# Patient Record
Sex: FEMALE | Race: WHITE | NOT HISPANIC OR LATINO | URBAN - NONMETROPOLITAN AREA
[De-identification: names, ages, dates, MRNs, and addresses within clinical notes are randomized per-mention and may not be internally consistent; named-entity substitution may affect disease eponyms.]

---

## 2023-09-27 NOTE — PROGRESS NOTES
Occupational Therapy    POC Trancription from [facility]    Name: Gina Cronin  MRN: [id number]  : 1970  Date: 23     Plan:  Treatment Interventions:  (vasopneumatic device , compression bandaging , edema control , education/instruction , garment measuring/fitting , home program , manual lymphatic drainage , manual therapy , therapeutic activities and therapeutic exercises.)  Frequency: on hold until after she receives custom garments      Goals:  Encounter Problems       Encounter Problems (Active)       OT Goals       Garments       Start:  23    Expected End:  23       Pt will don and doff compression garments mod I per recommended wear schedule 100% of the time         HEP       Start:  23    Expected End:  23        I with HEP         Lymphedema       Start:  23    Expected End:  23       Decrease volume in BLE by 10% to improve tissue health and reduce risk of infection,         LLIS       Start:  23    Expected End:  23       Pt will demonstrate improvement in QOL as evidenced by 20 point loss of LLIS by discharge

## 2023-10-10 ENCOUNTER — TELEPHONE (OUTPATIENT)
Dept: OCCUPATIONAL THERAPY | Facility: HOSPITAL | Age: 53
End: 2023-10-10

## 2023-10-10 NOTE — PROGRESS NOTES
Occupational Therapy    Discharge Summary    Name: Gina Cronin  MRN: [id number]  : 1970  Date: 10/10/23    Discharge Summary: OT    Discharge Information: Date of discharge 10/10/23, Date of last visit 23, Date of evaluation 23, Number of attended visits 12, Referred by [facility], and Referred for Lymphedema    Therapy Summary: Pt had significant reduction in LLE volume with inelastic wraps, MLD, pumping, skin care. Pt was able to get qualified for pneumatic pump for home and is consistent with performing 5-6x a week. Pt also fitted with custom flat knit garments and has been wearing for a few weeks.    Discharge Status: Pt does not need any modifications to custom garments, states the garments fit well and extremely comfortable. At this time, no further need for OT. Recommend discharge. Discussed on phone reasons to return to OT if having increased exacerbation. If she needs new garments, she is able to contact the company and order directly from them without OT intervention unless a new size is required.     Rehab Discharge Reason: Achieved all and/or the most significant goals(s)

## 2023-11-14 PROBLEM — L73.2 HYDRADENITIS: Status: ACTIVE | Noted: 2018-09-13

## 2023-11-14 PROBLEM — E55.9 VITAMIN D DEFICIENCY: Status: ACTIVE | Noted: 2023-11-14

## 2023-11-14 PROBLEM — K43.2 INCISIONAL HERNIA, WITHOUT OBSTRUCTION OR GANGRENE: Status: ACTIVE | Noted: 2023-11-14

## 2023-11-14 PROBLEM — R73.03 PREDIABETES: Status: RESOLVED | Noted: 2018-09-13 | Resolved: 2023-11-14

## 2023-11-14 PROBLEM — M17.12 PRIMARY OSTEOARTHRITIS OF LEFT KNEE: Status: ACTIVE | Noted: 2018-09-18

## 2023-11-14 PROBLEM — E11.9 TYPE 2 DIABETES MELLITUS (MULTI): Status: ACTIVE | Noted: 2020-08-05

## 2023-11-14 PROBLEM — G89.29 CHRONIC LOW BACK PAIN: Status: ACTIVE | Noted: 2023-11-14

## 2023-11-14 PROBLEM — M70.62 GREATER TROCHANTERIC BURSITIS OF LEFT HIP: Status: ACTIVE | Noted: 2021-09-13

## 2023-11-14 PROBLEM — E66.01 OBESITY, CLASS III, BMI 40-49.9 (MORBID OBESITY) (MULTI): Status: ACTIVE | Noted: 2018-09-13

## 2023-11-14 PROBLEM — F32.0 DEPRESSION, MAJOR, SINGLE EPISODE, MILD (CMS-HCC): Status: ACTIVE | Noted: 2018-09-13

## 2023-11-14 PROBLEM — N92.6 IRREGULAR MENSTRUATION: Status: ACTIVE | Noted: 2023-11-14

## 2023-11-14 PROBLEM — D69.3 CHRONIC ITP (IDIOPATHIC THROMBOCYTOPENIC PURPURA) (MULTI): Status: ACTIVE | Noted: 2018-09-13

## 2023-11-14 PROBLEM — I26.99 BILATERAL PULMONARY EMBOLISM (MULTI): Status: ACTIVE | Noted: 2021-10-18

## 2023-11-14 PROBLEM — R76.0 ANTICARDIOLIPIN ANTIBODY POSITIVE: Status: ACTIVE | Noted: 2023-11-14

## 2023-11-14 PROBLEM — R73.01 ELEVATED FASTING GLUCOSE: Status: ACTIVE | Noted: 2023-11-14

## 2023-11-14 PROBLEM — M76.02 GLUTEAL TENDONITIS OF LEFT BUTTOCK: Status: ACTIVE | Noted: 2021-09-13

## 2023-11-14 PROBLEM — I10 HYPERTENSION, ESSENTIAL: Status: ACTIVE | Noted: 2018-09-13

## 2023-11-14 PROBLEM — M17.0 BILATERAL PRIMARY OSTEOARTHRITIS OF KNEE: Status: ACTIVE | Noted: 2019-03-27

## 2023-11-14 PROBLEM — E66.813 OBESITY, CLASS III, BMI 40-49.9 (MORBID OBESITY): Status: ACTIVE | Noted: 2018-09-13

## 2023-11-14 PROBLEM — M54.50 CHRONIC LOW BACK PAIN: Status: ACTIVE | Noted: 2023-11-14

## 2023-11-14 PROBLEM — M75.101 ROTATOR CUFF SYNDROME OF RIGHT SHOULDER: Status: ACTIVE | Noted: 2022-11-03

## 2023-11-14 PROBLEM — M79.7 FIBROMYALGIA: Status: ACTIVE | Noted: 2018-09-13

## 2023-11-14 PROBLEM — F41.8 DEPRESSION WITH ANXIETY: Status: ACTIVE | Noted: 2023-11-14

## 2023-11-14 PROBLEM — M54.50 ACUTE MIDLINE LOW BACK PAIN WITHOUT SCIATICA: Status: RESOLVED | Noted: 2021-08-03 | Resolved: 2023-11-14

## 2023-11-14 NOTE — PROGRESS NOTES
Gina Cronin is a 53 y.o. female who presents for Establish Care (Would like to discuss weight issues, she does have HBP which seem to be under control for now, also borderline diabetic. Lots of anxiety issues. )    Xfer care from Dr. Mckenzie.    Prediabetes: Never been on meds. Never been on weight loss meds. Hasn't had a period for 6 or 7 years. Overdue for pap smear.     H/o blood clots: 2021, several in the legs and lung. She has had swelling since then. She is on warfarin lifelong. Getting INR monitored through [facility], following with Dr. Llanes.     ATP: Following with Dr. Llanes. Under control, not actively getting treatment.     HTN: On lisinopril, metoprolol, and lasix. No SE's.     Lymphedema: Going to [facility] who recommended her. She ended up getting massage, she has a pump that she is using daily. She is using custom stockings. She is wrapping them at night. She is taking lasix daily. She has been relatively stable this last 6mo with her regimen. Left leg worse than right.    Hidradenitis Supuritiva: On doxycycline. She has had surgery in the left axilla. Still occasionally getting flares/outbreaks. She has tried multiple different topical meds in the past.     GERD: Controlled on omeprazole.     Anxiety: On cymbalta, feels it is helping a bit but not happy with her symptoms. More anxious since menopause. Previously took 2 meds, the other was celexa. She has tried off both and that did not work. She does get hot flashes and mood swings. Has not tried effexor or paxil.     Review of systems completed and unremarkable other than what is documented in HPI.     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  Medical history: anxiety  Medications: calcium, vitamin b12, cymbalta, furosemide, lisinopril, metoprolol, MVI, omeprazole, warfarin  SurgHx: tonsillectomy, adenoidectomy, cholecystectomy, umbilical hernia  "repair, hidradenitis suppurativa surgery on left axilla  Fhx: DM and HTN runs in the family, no cancer, dad had angina and afib, mom had CAD, both her parents have passed,  Allergies: NKDA    Objective   BP (!) 142/104 (BP Location: Left arm)   Pulse 79   Ht 1.676 m (5' 6\")   Wt (!) 181 kg (399 lb 9.6 oz)   BMI 64.50 kg/m²     Gen: No acute distress, alert and oriented x3, pleasant   HEENT: moist mucous membranes, b/l external auditory canals are clear of debris, TMs within normal limits, no oropharyngeal lesions, eomi, perrla   Neck: thyroid within normal limits, no lymphadenopathy   CV: RRR, normal S1/S2, no murmur   Resp: Clear to auscultation bilaterally, no wheezes or rhonchi appreciated  Abd: soft, nontender, non-distended, no guarding/rigidity, bowel sounds present  Extr: no edema, no calf tenderness  Derm: Skin is warm and dry, no rashes appreciated  Psych: mood is good, affect is congruent, good hygiene, normal speech and eye contact  Neuro: cranial nerves grossly intact, normal gait    Assessment/Plan     #Diabetes  Discussed metformin vs ozempic  Recheck labs  Discuss weight loss meds in late winter    #H/o blood clots:   2021, several in the legs and lung  On warfarin lifelong  INR monitoring at [facility] through [facility]    #ATP:   Following with Dr. Llanes for surveillance    #HTN:   Controlled on lisinopril, metoprolol, and lasix    #Lymphedema:   Followup as needed from OT  Pumps at home  Using compression equipment  On lasix daily  Wrapping them at night    #Hidradenitis Supuritiva:   On doxycycline  H/o left axillary surgery  Has tried multiple topical meds in the past    #GERD:   Controlled on omeprazole    #Anxiety:   #Hot flashes  On cymbalta  Switch to effexor  We also discussed brisdelle    HCM:  Pap at followup (overdue)  Mammogram for summer time  Discuss C-scope at followup  "

## 2023-11-20 ENCOUNTER — OFFICE VISIT (OUTPATIENT)
Dept: PRIMARY CARE | Facility: CLINIC | Age: 53
End: 2023-11-20
Payer: COMMERCIAL

## 2023-11-20 VITALS
HEIGHT: 66 IN | SYSTOLIC BLOOD PRESSURE: 142 MMHG | DIASTOLIC BLOOD PRESSURE: 104 MMHG | BODY MASS INDEX: 47.09 KG/M2 | HEART RATE: 79 BPM | WEIGHT: 293 LBS

## 2023-11-20 DIAGNOSIS — F41.8 DEPRESSION WITH ANXIETY: ICD-10-CM

## 2023-11-20 DIAGNOSIS — E11.9 TYPE 2 DIABETES MELLITUS WITHOUT COMPLICATION, WITHOUT LONG-TERM CURRENT USE OF INSULIN (MULTI): Primary | ICD-10-CM

## 2023-11-20 DIAGNOSIS — Z13.220 SCREENING FOR HYPERLIPIDEMIA: ICD-10-CM

## 2023-11-20 PROCEDURE — 1036F TOBACCO NON-USER: CPT | Performed by: FAMILY MEDICINE

## 2023-11-20 PROCEDURE — 3080F DIAST BP >= 90 MM HG: CPT | Performed by: FAMILY MEDICINE

## 2023-11-20 PROCEDURE — 99204 OFFICE O/P NEW MOD 45 MIN: CPT | Performed by: FAMILY MEDICINE

## 2023-11-20 PROCEDURE — 4010F ACE/ARB THERAPY RXD/TAKEN: CPT | Performed by: FAMILY MEDICINE

## 2023-11-20 PROCEDURE — 3077F SYST BP >= 140 MM HG: CPT | Performed by: FAMILY MEDICINE

## 2023-11-20 RX ORDER — DULOXETIN HYDROCHLORIDE 60 MG/1
60 CAPSULE, DELAYED RELEASE ORAL DAILY
COMMUNITY

## 2023-11-20 RX ORDER — OMEPRAZOLE 20 MG/1
20 CAPSULE, DELAYED RELEASE ORAL
COMMUNITY

## 2023-11-20 RX ORDER — WARFARIN SODIUM 5 MG/1
TABLET ORAL
COMMUNITY
Start: 2023-04-11

## 2023-11-20 RX ORDER — MULTIVITAMIN
1 TABLET ORAL
COMMUNITY

## 2023-11-20 RX ORDER — FUROSEMIDE 20 MG/1
20 TABLET ORAL 2 TIMES DAILY
COMMUNITY

## 2023-11-20 RX ORDER — LANOLIN ALCOHOL/MO/W.PET/CERES
1000 CREAM (GRAM) TOPICAL
COMMUNITY

## 2023-11-20 RX ORDER — LISINOPRIL 20 MG/1
20 TABLET ORAL DAILY
COMMUNITY

## 2023-11-20 RX ORDER — DOXYCYCLINE HYCLATE 50 MG/1
50 CAPSULE ORAL DAILY
COMMUNITY

## 2023-11-20 RX ORDER — METOPROLOL TARTRATE 50 MG/1
50 TABLET ORAL 2 TIMES DAILY
COMMUNITY

## 2023-11-20 RX ORDER — VENLAFAXINE HYDROCHLORIDE 75 MG/1
75 CAPSULE, EXTENDED RELEASE ORAL DAILY
Qty: 30 CAPSULE | Refills: 5 | Status: SHIPPED | OUTPATIENT
Start: 2023-11-20 | End: 2024-04-08

## 2024-01-10 ENCOUNTER — LAB (OUTPATIENT)
Dept: LAB | Facility: LAB | Age: 54
End: 2024-01-10
Payer: COMMERCIAL

## 2024-01-10 DIAGNOSIS — E11.9 TYPE 2 DIABETES MELLITUS WITHOUT COMPLICATION, WITHOUT LONG-TERM CURRENT USE OF INSULIN (MULTI): ICD-10-CM

## 2024-01-10 DIAGNOSIS — Z13.220 SCREENING FOR HYPERLIPIDEMIA: ICD-10-CM

## 2024-01-10 LAB
ALBUMIN SERPL BCP-MCNC: 4.1 G/DL (ref 3.4–5)
ALP SERPL-CCNC: 60 U/L (ref 33–110)
ALT SERPL W P-5'-P-CCNC: 46 U/L (ref 7–45)
ANION GAP SERPL CALC-SCNC: 9 MMOL/L (ref 10–20)
AST SERPL W P-5'-P-CCNC: 29 U/L (ref 9–39)
BASOPHILS # BLD AUTO: 0.04 X10*3/UL (ref 0–0.1)
BASOPHILS NFR BLD AUTO: 0.6 %
BILIRUB SERPL-MCNC: 0.3 MG/DL (ref 0–1.2)
BUN SERPL-MCNC: 15 MG/DL (ref 6–23)
CALCIUM SERPL-MCNC: 9 MG/DL (ref 8.6–10.3)
CHLORIDE SERPL-SCNC: 103 MMOL/L (ref 98–107)
CHOLEST SERPL-MCNC: 180 MG/DL (ref 0–199)
CHOLESTEROL/HDL RATIO: 3.2
CO2 SERPL-SCNC: 31 MMOL/L (ref 21–32)
CREAT SERPL-MCNC: 0.66 MG/DL (ref 0.5–1.05)
EGFRCR SERPLBLD CKD-EPI 2021: >90 ML/MIN/1.73M*2
EOSINOPHIL # BLD AUTO: 0.13 X10*3/UL (ref 0–0.7)
EOSINOPHIL NFR BLD AUTO: 1.9 %
ERYTHROCYTE [DISTWIDTH] IN BLOOD BY AUTOMATED COUNT: 13.1 % (ref 11.5–14.5)
GLUCOSE SERPL-MCNC: 152 MG/DL (ref 74–99)
HCT VFR BLD AUTO: 42.3 % (ref 36–46)
HDLC SERPL-MCNC: 56.5 MG/DL
HGB BLD-MCNC: 13.6 G/DL (ref 12–16)
IMM GRANULOCYTES # BLD AUTO: 0.03 X10*3/UL (ref 0–0.7)
IMM GRANULOCYTES NFR BLD AUTO: 0.4 % (ref 0–0.9)
LDLC SERPL CALC-MCNC: 91 MG/DL
LYMPHOCYTES # BLD AUTO: 2.35 X10*3/UL (ref 1.2–4.8)
LYMPHOCYTES NFR BLD AUTO: 34.9 %
MCH RBC QN AUTO: 28.1 PG (ref 26–34)
MCHC RBC AUTO-ENTMCNC: 32.2 G/DL (ref 32–36)
MCV RBC AUTO: 87 FL (ref 80–100)
MONOCYTES # BLD AUTO: 0.41 X10*3/UL (ref 0.1–1)
MONOCYTES NFR BLD AUTO: 6.1 %
NEUTROPHILS # BLD AUTO: 3.78 X10*3/UL (ref 1.2–7.7)
NEUTROPHILS NFR BLD AUTO: 56.1 %
NON HDL CHOLESTEROL: 124 MG/DL (ref 0–149)
NRBC BLD-RTO: 0 /100 WBCS (ref 0–0)
PLATELET # BLD AUTO: 293 X10*3/UL (ref 150–450)
POTASSIUM SERPL-SCNC: 4 MMOL/L (ref 3.5–5.3)
PROT SERPL-MCNC: 7 G/DL (ref 6.4–8.2)
RBC # BLD AUTO: 4.84 X10*6/UL (ref 4–5.2)
SODIUM SERPL-SCNC: 139 MMOL/L (ref 136–145)
TRIGL SERPL-MCNC: 165 MG/DL (ref 0–149)
TSH SERPL-ACNC: 1.23 MIU/L (ref 0.44–3.98)
VLDL: 33 MG/DL (ref 0–40)
WBC # BLD AUTO: 6.7 X10*3/UL (ref 4.4–11.3)

## 2024-01-10 PROCEDURE — 85025 COMPLETE CBC W/AUTO DIFF WBC: CPT

## 2024-01-10 PROCEDURE — 36415 COLL VENOUS BLD VENIPUNCTURE: CPT

## 2024-01-10 PROCEDURE — 84443 ASSAY THYROID STIM HORMONE: CPT

## 2024-01-10 PROCEDURE — 80061 LIPID PANEL: CPT

## 2024-01-10 PROCEDURE — 83036 HEMOGLOBIN GLYCOSYLATED A1C: CPT

## 2024-01-10 PROCEDURE — 80053 COMPREHEN METABOLIC PANEL: CPT

## 2024-01-11 LAB
EST. AVERAGE GLUCOSE BLD GHB EST-MCNC: 169 MG/DL
HBA1C MFR BLD: 7.5 %

## 2024-02-14 NOTE — PROGRESS NOTES
"Gina Cronin is a 53 y.o. female who presents for Gynecologic Exam (Thinks she may have a hernia on right side)    Last period was 5 years ago. She has not had abnormal pap.    Prediabetes: Never been on meds. Never been on weight loss meds. Hasn't had a period for 6 or 7 years. Overdue for pap smear.      H/o blood clots: 2021, several in the legs and lung. She has had swelling since then. She is on warfarin lifelong. Getting INR monitored through [facility], following with Dr. Llanes.      ATP: Following with Dr. Llanes. Under control, not actively getting treatment.      HTN: On lisinopril, metoprolol, and lasix. No SE's.      Lymphedema: Going to [facility] who recommended her. She ended up getting massage, she has a pump that she is using daily. She is using custom stockings. She is wrapping them at night. She is taking lasix daily. She has been relatively stable this last 6mo with her regimen. Left leg worse than right.     Hidradenitis Supuritiva: On doxycycline. She has had surgery in the left axilla. Still occasionally getting flares/outbreaks. She has tried multiple different topical meds in the past.      GERD: Controlled on omeprazole.      Anxiety: On cymbalta, feels it is helping a bit but not happy with her symptoms. More anxious since menopause. Previously took 2 meds, the other was celexa. She has tried off both and that did not work. She does get hot flashes and mood swings. Has not tried effexor or paxil.      Review of systems completed and unremarkable other than what is documented in HPI.    Objective   BP (!) 178/103 (BP Location: Left arm, Patient Position: Sitting, BP Cuff Size: Adult)   Pulse 77   Ht 1.676 m (5' 6\")   Wt (!) 183 kg (403 lb)   BMI 65.05 kg/m²     Gen: No acute distress, alert and oriented x3, pleasant   HEENT: moist mucous membranes, b/l external auditory canals are clear of debris, TMs within normal limits, no oropharyngeal lesions, eomi, perrla   Neck: " thyroid within normal limits, no lymphadenopathy   CV: RRR, normal S1/S2, no murmur   Resp: Clear to auscultation bilaterally, no wheezes or rhonchi appreciated  Abd: soft, nontender, non-distended, no guarding/rigidity, bowel sounds present  Extr: no edema, no calf tenderness  Derm: Skin is warm and dry, no rashes appreciated  Psych: mood is good, affect is congruent, good hygiene, normal speech and eye contact  Neuro: cranial nerves grossly intact, normal gait  Breast exam: No masses noted. No suspicious skin lesions. B/L nipple and areola WNL. No axillary lymphadenopathy noted. There is B/L axillary scarring from hidradenitis which appears stable  Pelvic exam: Normal external genitalia. Vaginal canal with no discharge or lesions. Cervix is closed without discharge or suspicious lesions. No uterine enlargement or masses noted. B/L ovaries WNL. No significant pelvic tenderness noted.      Assessment/Plan     Pap collected    #Diabetes  Rx sent ozempic  Followup in 3 mo     #H/o blood clots:   2021, several in the legs and lung  On warfarin lifelong  INR monitoring at [facility] through [facility]     #ATP:   Following with Dr. Llanes for surveillance     #HTN:   Controlled on lisinopril, metoprolol, and lasix     #Lymphedema:   Followup as needed from OT  Pumps at home  Using compression equipment  On lasix daily  Wrapping them at night     #Hidradenitis Supuritiva:   On doxycycline  H/o left axillary surgery  Has tried multiple topical meds in the past     #GERD:   Controlled on omeprazole     #Anxiety:   #Hot flashes  On cymbalta  Switch to effexor  We also discussed brisdelle     HCM:  Pap at followup (overdue)  Mammogram for summer time  Discuss C-scope at followup

## 2024-02-21 ENCOUNTER — PROCEDURE VISIT (OUTPATIENT)
Dept: PRIMARY CARE | Facility: CLINIC | Age: 54
End: 2024-02-21
Payer: COMMERCIAL

## 2024-02-21 VITALS
HEART RATE: 77 BPM | SYSTOLIC BLOOD PRESSURE: 178 MMHG | WEIGHT: 293 LBS | HEIGHT: 66 IN | BODY MASS INDEX: 47.09 KG/M2 | DIASTOLIC BLOOD PRESSURE: 103 MMHG

## 2024-02-21 DIAGNOSIS — Z12.4 SCREENING FOR CERVICAL CANCER: Primary | ICD-10-CM

## 2024-02-21 DIAGNOSIS — Z12.31 ENCOUNTER FOR SCREENING MAMMOGRAM FOR MALIGNANT NEOPLASM OF BREAST: ICD-10-CM

## 2024-02-21 DIAGNOSIS — E11.9 TYPE 2 DIABETES MELLITUS WITHOUT COMPLICATION, WITHOUT LONG-TERM CURRENT USE OF INSULIN (MULTI): ICD-10-CM

## 2024-02-21 PROCEDURE — 99396 PREV VISIT EST AGE 40-64: CPT | Performed by: FAMILY MEDICINE

## 2024-02-21 PROCEDURE — 88175 CYTOPATH C/V AUTO FLUID REDO: CPT

## 2024-02-21 PROCEDURE — 87624 HPV HI-RISK TYP POOLED RSLT: CPT

## 2024-02-21 NOTE — PATIENT INSTRUCTIONS
Radiology:  [place]:  [phone]    [place]:  [phone] opt 2    [facility] [phone]     General Surgery:  Angel Aguiar () [phone]

## 2024-02-23 ENCOUNTER — TELEPHONE (OUTPATIENT)
Dept: PRIMARY CARE | Facility: CLINIC | Age: 54
End: 2024-02-23
Payer: COMMERCIAL

## 2024-02-23 DIAGNOSIS — E11.9 TYPE 2 DIABETES MELLITUS WITHOUT COMPLICATION, WITHOUT LONG-TERM CURRENT USE OF INSULIN (MULTI): ICD-10-CM

## 2024-02-23 DIAGNOSIS — E66.01 OBESITY, CLASS III, BMI 40-49.9 (MORBID OBESITY) (MULTI): Primary | ICD-10-CM

## 2024-02-23 RX ORDER — TIRZEPATIDE 2.5 MG/.5ML
2.5 INJECTION, SOLUTION SUBCUTANEOUS
Qty: 2 ML | Refills: 1 | Status: SHIPPED | OUTPATIENT
Start: 2024-02-23 | End: 2024-04-23

## 2024-02-28 DIAGNOSIS — E11.9 TYPE 2 DIABETES MELLITUS WITHOUT COMPLICATION, WITHOUT LONG-TERM CURRENT USE OF INSULIN (MULTI): Primary | ICD-10-CM

## 2024-02-28 RX ORDER — METFORMIN HYDROCHLORIDE 500 MG/1
500 TABLET ORAL
Qty: 60 TABLET | Refills: 11 | Status: SHIPPED | OUTPATIENT
Start: 2024-02-28 | End: 2025-02-27

## 2024-03-08 DIAGNOSIS — J01.90 ACUTE SINUSITIS, RECURRENCE NOT SPECIFIED, UNSPECIFIED LOCATION: Primary | ICD-10-CM

## 2024-03-08 RX ORDER — FLUTICASONE FUROATE 27.5 UG/1
1 SPRAY, METERED NASAL
Qty: 10 G | Refills: 5 | Status: SHIPPED | OUTPATIENT
Start: 2024-03-08 | End: 2025-03-08

## 2024-03-08 RX ORDER — CEPHALEXIN 500 MG/1
500 CAPSULE ORAL 2 TIMES DAILY
Qty: 20 CAPSULE | Refills: 0 | Status: SHIPPED | OUTPATIENT
Start: 2024-03-08 | End: 2024-03-18

## 2024-03-12 LAB
CYTOLOGY CMNT CVX/VAG CYTO-IMP: NORMAL
HPV HR 12 DNA GENITAL QL NAA+PROBE: NEGATIVE
HPV HR GENOTYPES PNL CVX NAA+PROBE: NEGATIVE
HPV16 DNA SPEC QL NAA+PROBE: NEGATIVE
HPV18 DNA SPEC QL NAA+PROBE: NEGATIVE
LAB AP HPV GENOTYPE QUESTION: YES
LAB AP HPV HR: NORMAL
LABORATORY COMMENT REPORT: NORMAL
PATH REPORT.TOTAL CANCER: NORMAL

## 2024-04-07 DIAGNOSIS — F41.8 DEPRESSION WITH ANXIETY: ICD-10-CM

## 2024-04-08 RX ORDER — VENLAFAXINE HYDROCHLORIDE 75 MG/1
75 CAPSULE, EXTENDED RELEASE ORAL DAILY
Qty: 30 CAPSULE | Refills: 5 | Status: SHIPPED | OUTPATIENT
Start: 2024-04-08 | End: 2024-10-05

## 2024-06-11 NOTE — PROGRESS NOTES
"Subjective   Patient ID: Gina Cronin is a 53 y.o. female who presents for Follow-up (4 months).     DM: She tried to get ozempic but insurance would not cover. She is taking metformin. No SE's but she has gained an additional 4lb. She is interested in trying a GLP-1. She states that she is having good days and bad days with the diet.      H/o blood clots: 2021, several in the legs and lung. She has had swelling since then. She is on warfarin lifelong. Getting INR monitored through [facility], following with Dr. Llanes.      ITP: Following with Dr. Llanes. Under control, not actively getting treatment.      HTN: On lisinopril, metoprolol, and lasix. No SE's.      Lymphedema: Going to [facility] who recommended her. She ended up getting massage, she has a pump that she is using daily. She is using custom stockings. She is wrapping them at night. She is taking lasix daily. She has been relatively stable this last 6mo with her regimen. Left leg worse than right.     Hidradenitis Supuritiva: On doxycycline. She has had surgery in the left axilla. Still occasionally getting flares/outbreaks. She has tried multiple different topical meds in the past.      GERD: Controlled on omeprazole.      Anxiety: On cymbalta, feels it is helping a bit but not happy with her symptoms. More anxious since menopause. Previously took 2 meds, the other was celexa. She has tried off both and that did not work. She does get hot flashes and mood swings. Has not tried effexor or paxil.      Review of systems completed and unremarkable other than what is documented in HPI.    Objective   BP (!) 169/91 (BP Location: Left arm, Patient Position: Sitting, BP Cuff Size: Adult)   Pulse 76   Ht 1.676 m (5' 6\")   Wt (!) 185 kg (408 lb)   BMI 65.85 kg/m²     Gen: No acute distress, alert and oriented x3, pleasant   HEENT: moist mucous membranes, b/l external auditory canals are clear of debris, TMs within normal limits, no " oropharyngeal lesions, eomi, perrla   Neck: thyroid within normal limits, no lymphadenopathy   CV: RRR, normal S1/S2, no murmur   Resp: Clear to auscultation bilaterally, no wheezes or rhonchi appreciated  Abd: soft, nontender, non-distended, no guarding/rigidity, bowel sounds present  Extr: no edema, no calf tenderness  Derm: Skin is warm and dry, no rashes appreciated  Psych: mood is good, affect is congruent, good hygiene, normal speech and eye contact  Neuro: cranial nerves grossly intact, normal gait    Assessment/Plan   #Diabetes  Insurance will not cover ozempic  She is on metformin now, no weight loss  Rx sent trulicity     #H/o blood clots:   2021, several in the legs and lung  On warfarin lifelong  INR monitoring at [facility] through [facility]     #ITP:   Following with Dr. Llanes for surveillance     #HTN:   Controlled on lisinopril, metoprolol, and lasix  Increasing lasix to 20mg t2 tabs QAM and 1 tab QPM     #Lymphedema:   Followup as needed from OT  Pumps at home  Using compression equipment  On lasix daily, increasing dose  Wrapping them at night     #Hidradenitis Supuritiva:   On doxycycline  H/o left axillary surgery  Has tried multiple topical meds in the past     #GERD:   Controlled on omeprazole     #Anxiety:   #Hot flashes  On cymbalta  She is on effexor now, helping with anxiety  Hot flashes have improved  We also discussed brisdelle     HCM:  Pap 2024 neg, next due 2029  Mammogram for summer time  Discuss C-scope at followup

## 2024-06-19 ENCOUNTER — APPOINTMENT (OUTPATIENT)
Dept: PRIMARY CARE | Facility: CLINIC | Age: 54
End: 2024-06-19
Payer: COMMERCIAL

## 2024-06-19 VITALS
DIASTOLIC BLOOD PRESSURE: 91 MMHG | HEART RATE: 76 BPM | HEIGHT: 66 IN | WEIGHT: 293 LBS | SYSTOLIC BLOOD PRESSURE: 169 MMHG | BODY MASS INDEX: 47.09 KG/M2

## 2024-06-19 DIAGNOSIS — F41.8 DEPRESSION WITH ANXIETY: ICD-10-CM

## 2024-06-19 DIAGNOSIS — K21.9 GASTROESOPHAGEAL REFLUX DISEASE, UNSPECIFIED WHETHER ESOPHAGITIS PRESENT: Primary | ICD-10-CM

## 2024-06-19 DIAGNOSIS — Z12.31 ENCOUNTER FOR SCREENING MAMMOGRAM FOR MALIGNANT NEOPLASM OF BREAST: ICD-10-CM

## 2024-06-19 DIAGNOSIS — L73.2 HYDRADENITIS: ICD-10-CM

## 2024-06-19 DIAGNOSIS — I10 HYPERTENSION, ESSENTIAL: ICD-10-CM

## 2024-06-19 DIAGNOSIS — M79.7 FIBROMYALGIA: ICD-10-CM

## 2024-06-19 DIAGNOSIS — E11.9 TYPE 2 DIABETES MELLITUS WITHOUT COMPLICATION, WITHOUT LONG-TERM CURRENT USE OF INSULIN (MULTI): ICD-10-CM

## 2024-06-19 PROCEDURE — 3048F LDL-C <100 MG/DL: CPT | Performed by: FAMILY MEDICINE

## 2024-06-19 PROCEDURE — 1036F TOBACCO NON-USER: CPT | Performed by: FAMILY MEDICINE

## 2024-06-19 PROCEDURE — 3077F SYST BP >= 140 MM HG: CPT | Performed by: FAMILY MEDICINE

## 2024-06-19 PROCEDURE — 99214 OFFICE O/P EST MOD 30 MIN: CPT | Performed by: FAMILY MEDICINE

## 2024-06-19 PROCEDURE — 3080F DIAST BP >= 90 MM HG: CPT | Performed by: FAMILY MEDICINE

## 2024-06-19 PROCEDURE — 3051F HG A1C>EQUAL 7.0%<8.0%: CPT | Performed by: FAMILY MEDICINE

## 2024-06-19 PROCEDURE — 4010F ACE/ARB THERAPY RXD/TAKEN: CPT | Performed by: FAMILY MEDICINE

## 2024-06-19 RX ORDER — LISINOPRIL 20 MG/1
20 TABLET ORAL DAILY
Qty: 30 TABLET | Refills: 11 | Status: SHIPPED | OUTPATIENT
Start: 2024-06-19 | End: 2025-06-19

## 2024-06-19 RX ORDER — OMEPRAZOLE 20 MG/1
20 CAPSULE, DELAYED RELEASE ORAL
Qty: 30 CAPSULE | Refills: 11 | Status: SHIPPED | OUTPATIENT
Start: 2024-06-19 | End: 2025-06-19

## 2024-06-19 RX ORDER — METFORMIN HYDROCHLORIDE 500 MG/1
500 TABLET ORAL
Qty: 60 TABLET | Refills: 11 | Status: SHIPPED | OUTPATIENT
Start: 2024-06-19 | End: 2025-06-19

## 2024-06-19 RX ORDER — DULOXETIN HYDROCHLORIDE 60 MG/1
60 CAPSULE, DELAYED RELEASE ORAL DAILY
Qty: 30 CAPSULE | Refills: 11 | Status: SHIPPED | OUTPATIENT
Start: 2024-06-19 | End: 2024-06-19 | Stop reason: ALTCHOICE

## 2024-06-19 RX ORDER — FUROSEMIDE 20 MG/1
TABLET ORAL
Qty: 270 TABLET | Refills: 1 | Status: SHIPPED | OUTPATIENT
Start: 2024-06-19

## 2024-06-19 RX ORDER — METOPROLOL TARTRATE 50 MG/1
50 TABLET ORAL 2 TIMES DAILY
Qty: 60 TABLET | Refills: 11 | Status: SHIPPED | OUTPATIENT
Start: 2024-06-19 | End: 2025-06-19

## 2024-06-19 RX ORDER — DOXYCYCLINE HYCLATE 50 MG/1
50 CAPSULE ORAL DAILY
Qty: 30 CAPSULE | Refills: 3 | Status: SHIPPED | OUTPATIENT
Start: 2024-06-19

## 2024-06-19 RX ORDER — DULAGLUTIDE 0.75 MG/.5ML
0.75 INJECTION, SOLUTION SUBCUTANEOUS
Qty: 2 ML | Refills: 11 | Status: SHIPPED | OUTPATIENT
Start: 2024-06-23

## 2024-06-19 RX ORDER — VENLAFAXINE HYDROCHLORIDE 75 MG/1
75 CAPSULE, EXTENDED RELEASE ORAL DAILY
Qty: 30 CAPSULE | Refills: 11 | Status: SHIPPED | OUTPATIENT
Start: 2024-06-19 | End: 2025-06-19

## 2024-06-19 RX ORDER — FUROSEMIDE 20 MG/1
20 TABLET ORAL 2 TIMES DAILY
Qty: 60 TABLET | Refills: 11 | Status: SHIPPED | OUTPATIENT
Start: 2024-06-19 | End: 2024-06-19 | Stop reason: SDUPTHER

## 2024-07-15 ENCOUNTER — HOSPITAL ENCOUNTER (OUTPATIENT)
Dept: RADIOLOGY | Facility: HOSPITAL | Age: 54
Discharge: HOME | End: 2024-07-15
Payer: COMMERCIAL

## 2024-07-15 ENCOUNTER — HOSPITAL ENCOUNTER (OUTPATIENT)
Dept: RADIOLOGY | Facility: EXTERNAL LOCATION | Age: 54
Discharge: HOME | End: 2024-07-15

## 2024-07-15 VITALS — HEIGHT: 65 IN | BODY MASS INDEX: 48.82 KG/M2 | WEIGHT: 293 LBS

## 2024-07-15 DIAGNOSIS — Z12.31 ENCOUNTER FOR SCREENING MAMMOGRAM FOR MALIGNANT NEOPLASM OF BREAST: ICD-10-CM

## 2024-07-15 PROCEDURE — 77063 BREAST TOMOSYNTHESIS BI: CPT | Performed by: RADIOLOGY

## 2024-07-15 PROCEDURE — 77067 SCR MAMMO BI INCL CAD: CPT | Performed by: RADIOLOGY

## 2024-07-15 PROCEDURE — 77063 BREAST TOMOSYNTHESIS BI: CPT

## 2024-07-17 ENCOUNTER — HOSPITAL ENCOUNTER (OUTPATIENT)
Dept: RADIOLOGY | Facility: EXTERNAL LOCATION | Age: 54
Discharge: HOME | End: 2024-07-17

## 2024-10-09 DIAGNOSIS — L73.2 HYDRADENITIS: ICD-10-CM

## 2024-10-09 RX ORDER — DOXYCYCLINE HYCLATE 50 MG/1
50 CAPSULE, GELATIN COATED ORAL DAILY
Qty: 30 CAPSULE | Refills: 3 | Status: SHIPPED | OUTPATIENT
Start: 2024-10-09

## 2024-10-24 ENCOUNTER — LAB (OUTPATIENT)
Dept: LAB | Facility: LAB | Age: 54
End: 2024-10-24
Payer: COMMERCIAL

## 2024-10-24 DIAGNOSIS — E11.9 TYPE 2 DIABETES MELLITUS WITHOUT COMPLICATION, WITHOUT LONG-TERM CURRENT USE OF INSULIN (MULTI): ICD-10-CM

## 2024-10-24 LAB
ALBUMIN SERPL BCP-MCNC: 4.2 G/DL (ref 3.4–5)
ALP SERPL-CCNC: 77 U/L (ref 33–110)
ALT SERPL W P-5'-P-CCNC: 38 U/L (ref 7–45)
ANION GAP SERPL CALC-SCNC: 11 MMOL/L (ref 10–20)
AST SERPL W P-5'-P-CCNC: 26 U/L (ref 9–39)
BASOPHILS # BLD AUTO: 0.04 X10*3/UL (ref 0–0.1)
BASOPHILS NFR BLD AUTO: 0.5 %
BILIRUB SERPL-MCNC: 0.5 MG/DL (ref 0–1.2)
BUN SERPL-MCNC: 12 MG/DL (ref 6–23)
CALCIUM SERPL-MCNC: 8.9 MG/DL (ref 8.6–10.3)
CHLORIDE SERPL-SCNC: 102 MMOL/L (ref 98–107)
CHOLEST SERPL-MCNC: 176 MG/DL (ref 0–199)
CHOLESTEROL/HDL RATIO: 3.5
CO2 SERPL-SCNC: 29 MMOL/L (ref 21–32)
CREAT SERPL-MCNC: 0.66 MG/DL (ref 0.5–1.05)
EGFRCR SERPLBLD CKD-EPI 2021: >90 ML/MIN/1.73M*2
EOSINOPHIL # BLD AUTO: 0.14 X10*3/UL (ref 0–0.7)
EOSINOPHIL NFR BLD AUTO: 1.7 %
ERYTHROCYTE [DISTWIDTH] IN BLOOD BY AUTOMATED COUNT: 13.5 % (ref 11.5–14.5)
EST. AVERAGE GLUCOSE BLD GHB EST-MCNC: 151 MG/DL
GLUCOSE SERPL-MCNC: 137 MG/DL (ref 74–99)
HBA1C MFR BLD: 6.9 %
HCT VFR BLD AUTO: 42.3 % (ref 36–46)
HDLC SERPL-MCNC: 50.2 MG/DL
HGB BLD-MCNC: 13.5 G/DL (ref 12–16)
IMM GRANULOCYTES # BLD AUTO: 0.05 X10*3/UL (ref 0–0.7)
IMM GRANULOCYTES NFR BLD AUTO: 0.6 % (ref 0–0.9)
LDLC SERPL CALC-MCNC: 92 MG/DL
LYMPHOCYTES # BLD AUTO: 2.6 X10*3/UL (ref 1.2–4.8)
LYMPHOCYTES NFR BLD AUTO: 32.5 %
MCH RBC QN AUTO: 27.7 PG (ref 26–34)
MCHC RBC AUTO-ENTMCNC: 31.9 G/DL (ref 32–36)
MCV RBC AUTO: 87 FL (ref 80–100)
MONOCYTES # BLD AUTO: 0.52 X10*3/UL (ref 0.1–1)
MONOCYTES NFR BLD AUTO: 6.5 %
NEUTROPHILS # BLD AUTO: 4.66 X10*3/UL (ref 1.2–7.7)
NEUTROPHILS NFR BLD AUTO: 58.2 %
NON HDL CHOLESTEROL: 126 MG/DL (ref 0–149)
NRBC BLD-RTO: 0 /100 WBCS (ref 0–0)
PLATELET # BLD AUTO: 330 X10*3/UL (ref 150–450)
POTASSIUM SERPL-SCNC: 4.3 MMOL/L (ref 3.5–5.3)
PROT SERPL-MCNC: 7.3 G/DL (ref 6.4–8.2)
RBC # BLD AUTO: 4.88 X10*6/UL (ref 4–5.2)
SODIUM SERPL-SCNC: 138 MMOL/L (ref 136–145)
TRIGL SERPL-MCNC: 170 MG/DL (ref 0–149)
TSH SERPL-ACNC: 1.62 MIU/L (ref 0.44–3.98)
VLDL: 34 MG/DL (ref 0–40)
WBC # BLD AUTO: 8 X10*3/UL (ref 4.4–11.3)

## 2024-10-24 PROCEDURE — 80053 COMPREHEN METABOLIC PANEL: CPT

## 2024-10-24 PROCEDURE — 83036 HEMOGLOBIN GLYCOSYLATED A1C: CPT

## 2024-10-24 PROCEDURE — 80061 LIPID PANEL: CPT

## 2024-10-24 PROCEDURE — 85025 COMPLETE CBC W/AUTO DIFF WBC: CPT

## 2024-10-24 PROCEDURE — 84443 ASSAY THYROID STIM HORMONE: CPT

## 2024-10-24 PROCEDURE — 36415 COLL VENOUS BLD VENIPUNCTURE: CPT

## 2024-10-30 ENCOUNTER — APPOINTMENT (OUTPATIENT)
Dept: PRIMARY CARE | Facility: CLINIC | Age: 54
End: 2024-10-30
Payer: COMMERCIAL

## 2024-10-30 VITALS
DIASTOLIC BLOOD PRESSURE: 85 MMHG | HEART RATE: 80 BPM | HEIGHT: 65 IN | SYSTOLIC BLOOD PRESSURE: 129 MMHG | WEIGHT: 293 LBS | BODY MASS INDEX: 48.82 KG/M2

## 2024-10-30 DIAGNOSIS — F41.8 DEPRESSION WITH ANXIETY: ICD-10-CM

## 2024-10-30 DIAGNOSIS — E11.9 TYPE 2 DIABETES MELLITUS WITHOUT COMPLICATION, WITHOUT LONG-TERM CURRENT USE OF INSULIN (MULTI): Primary | ICD-10-CM

## 2024-10-30 DIAGNOSIS — D69.3 CHRONIC ITP (IDIOPATHIC THROMBOCYTOPENIC PURPURA) (MULTI): ICD-10-CM

## 2024-10-30 DIAGNOSIS — Z12.11 SCREENING FOR COLON CANCER: ICD-10-CM

## 2024-10-30 PROCEDURE — 1036F TOBACCO NON-USER: CPT | Performed by: FAMILY MEDICINE

## 2024-10-30 PROCEDURE — 3074F SYST BP LT 130 MM HG: CPT | Performed by: FAMILY MEDICINE

## 2024-10-30 PROCEDURE — 3048F LDL-C <100 MG/DL: CPT | Performed by: FAMILY MEDICINE

## 2024-10-30 PROCEDURE — 4010F ACE/ARB THERAPY RXD/TAKEN: CPT | Performed by: FAMILY MEDICINE

## 2024-10-30 PROCEDURE — 3008F BODY MASS INDEX DOCD: CPT | Performed by: FAMILY MEDICINE

## 2024-10-30 PROCEDURE — 3044F HG A1C LEVEL LT 7.0%: CPT | Performed by: FAMILY MEDICINE

## 2024-10-30 PROCEDURE — 3079F DIAST BP 80-89 MM HG: CPT | Performed by: FAMILY MEDICINE

## 2024-10-30 PROCEDURE — 99214 OFFICE O/P EST MOD 30 MIN: CPT | Performed by: FAMILY MEDICINE

## 2024-10-30 RX ORDER — VENLAFAXINE HYDROCHLORIDE 150 MG/1
150 CAPSULE, EXTENDED RELEASE ORAL DAILY
Qty: 30 CAPSULE | Refills: 1 | Status: SHIPPED | OUTPATIENT
Start: 2024-10-30 | End: 2024-12-29

## 2024-10-30 RX ORDER — DULAGLUTIDE 1.5 MG/.5ML
1.5 INJECTION, SOLUTION SUBCUTANEOUS WEEKLY
Qty: 2 ML | Refills: 11 | Status: SHIPPED | OUTPATIENT
Start: 2024-10-30

## 2024-12-14 LAB — NONINV COLON CA DNA+OCC BLD SCRN STL QL: NEGATIVE

## 2024-12-18 DIAGNOSIS — I10 HYPERTENSION, ESSENTIAL: ICD-10-CM

## 2024-12-18 DIAGNOSIS — F41.8 DEPRESSION WITH ANXIETY: ICD-10-CM

## 2024-12-19 ENCOUNTER — PATIENT MESSAGE (OUTPATIENT)
Dept: PRIMARY CARE | Facility: CLINIC | Age: 54
End: 2024-12-19
Payer: COMMERCIAL

## 2024-12-19 RX ORDER — VENLAFAXINE HYDROCHLORIDE 150 MG/1
150 CAPSULE, EXTENDED RELEASE ORAL DAILY
Qty: 30 CAPSULE | Refills: 1 | Status: SHIPPED | OUTPATIENT
Start: 2024-12-19 | End: 2025-02-17

## 2024-12-19 RX ORDER — FUROSEMIDE 20 MG/1
TABLET ORAL
Qty: 270 TABLET | Refills: 1 | Status: SHIPPED | OUTPATIENT
Start: 2024-12-19

## 2025-02-16 ENCOUNTER — PATIENT MESSAGE (OUTPATIENT)
Dept: PRIMARY CARE | Facility: CLINIC | Age: 55
End: 2025-02-16
Payer: COMMERCIAL

## 2025-02-16 DIAGNOSIS — E11.9 TYPE 2 DIABETES MELLITUS WITHOUT COMPLICATION, WITHOUT LONG-TERM CURRENT USE OF INSULIN (MULTI): ICD-10-CM

## 2025-02-17 RX ORDER — METFORMIN HYDROCHLORIDE 500 MG/1
500 TABLET ORAL
Qty: 180 TABLET | Refills: 3 | Status: SHIPPED | OUTPATIENT
Start: 2025-02-17 | End: 2026-02-17

## 2025-02-19 DIAGNOSIS — F41.8 DEPRESSION WITH ANXIETY: ICD-10-CM

## 2025-02-19 RX ORDER — VENLAFAXINE HYDROCHLORIDE 150 MG/1
150 CAPSULE, EXTENDED RELEASE ORAL DAILY
Qty: 30 CAPSULE | Refills: 1 | Status: SHIPPED | OUTPATIENT
Start: 2025-02-19 | End: 2025-04-20

## 2025-03-05 NOTE — PROGRESS NOTES
"Subjective   Patient ID: Gina Cronin is a 54 y.o. female who presents for Follow-up (4 months; has a sore on her left hip, it's been there for a while, seeps occasionally; has had some nausea and diarrhea lately; light sensitivity).    She has been dealing with diarrhea, nausea, and low appetite. No fever. She felt this leading up to the days before her trulicity shot.     She had an episode a week ago. She had just eaten and was cleaning up. First felt nausea and then felt like she had to go to the bathroom. Started to feel like she was going to pass out. Thought she was going to have diarrhea or vomit but neither happened. She sat down and felt better but the sx were not gone. Sx lasted 30 minutes and then went away. Felt \"off\" the next 24 hours. No sick contacts at home. Heart rate was on the high side, BP was on the high side, blood sugar was in the 160's. She has had brief episodes in the past but never even close to this long. No room spinning. [place] like she was going sideways as she went down the alejandro. No sensation of palpitations. No abdominal pain. Cold sweats. She was salivating like she was going to throw up.      DM: She tried to get ozempic but insurance would not cover. She is taking metformin. She is taking trulicity, felt a difference at first and was doing better but now has backslid a bit. Ocsasional diarrhea.      H/o blood clots: 2021, several in the legs and lung. She has had swelling since then. She is on warfarin lifelong. Getting INR monitored through [facility], following with Dr. Llanes.      ITP: Following with Dr. Llanes. Under control, not actively getting treatment.      HTN: On lisinopril, metoprolol, and lasix. No SE's.      Lymphedema: Going to [facility] who recommended her. She ended up getting massage, she has a pump that she is using daily. She is using custom stockings. She is wrapping them at night. She is taking lasix daily. She has been relatively stable this " "last 6mo with her regimen. Left leg worse than right.     Hidradenitis Supuritiva: On doxycycline. She has had surgery in the left axilla. Still occasionally getting flares/outbreaks. She has tried multiple different topical meds in the past.      GERD: Controlled on omeprazole.      Anxiety: On cymbalta, feels it is helping a bit but not happy with her symptoms. More anxious since menopause. Previously took 2 meds, the other was celexa. She has tried off both and that did not work. She does get hot flashes and mood swings. Has not tried effexor or paxil.      Review of systems completed and unremarkable other than what is documented in HPI.    Objective   /81 (BP Location: Right arm, Patient Position: Sitting, BP Cuff Size: Large adult long)   Pulse 86   Ht 1.651 m (5' 5\")   Wt (!) 174 kg (384 lb)   BMI 63.90 kg/m²     Gen: No acute distress, alert and oriented x3, pleasant   HEENT: moist mucous membranes, b/l external auditory canals are clear of debris, TMs within normal limits, no oropharyngeal lesions, eomi, perrla   Neck: thyroid within normal limits, no lymphadenopathy   CV: RRR, normal S1/S2, no murmur   Resp: Clear to auscultation bilaterally, no wheezes or rhonchi appreciated  Abd: soft, nontender, non-distended, no guarding/rigidity, bowel sounds present  Extr: no edema, no calf tenderness  Derm: Skin is warm and dry, no rashes appreciated, left side of abdomen with linear lesion, scaling, serous drainage, concerning for SCC  Psych: mood is good, affect is congruent, good hygiene, normal speech and eye contact  Neuro: cranial nerves grossly intact, normal gait    Patient Health Questionnaire-2 Score: 0 (3/10/2025 11:42 AM)     Assessment/Plan   #Suspicious nevus  Trial mupirocin  Referring to derm    #GI sx  Negative for flu    #Hyperhidrosis  Trial dabomatic    #Diabetes  Insurance will not cover ozempic  She is on metformin now, no weight loss  Doing ok on trulicity but GI sx worse and A1c has " gotten worse  Trial ozempic     #H/o blood clots:   2021, several in the legs and lung  On warfarin lifelong  INR monitoring at [facility] through [facility]     #ITP:   Following with Dr. Llanes for surveillance     #HTN:   Controlled on lisinopril, metoprolol, and lasix  Increasing lasix to 20mg t2 tabs QAM and 1 tab QPM     #Lymphedema:   Followup as needed from OT  Pumps at home  Using compression equipment  On lasix daily, increasing dose  Wrapping them at night     #Hidradenitis Supuritiva:   On doxycycline  H/o left axillary surgery  Has tried multiple topical meds in the past     #GERD:   Controlled on omeprazole     #Anxiety:   #Hot flashes  On cymbalta  Doing well on venlafaxine  We also discussed brisdelle     HCM:  Pap 2024 neg, next due 2029  Mammogram for summer time  Discuss C-scope at followup

## 2025-03-06 ENCOUNTER — APPOINTMENT (OUTPATIENT)
Dept: PRIMARY CARE | Facility: CLINIC | Age: 55
End: 2025-03-06
Payer: COMMERCIAL

## 2025-03-06 LAB
ALBUMIN SERPL-MCNC: 4.3 G/DL (ref 3.6–5.1)
ALP SERPL-CCNC: 80 U/L (ref 37–153)
ALT SERPL-CCNC: 35 U/L (ref 6–29)
ANION GAP SERPL CALCULATED.4IONS-SCNC: 7 MMOL/L (CALC) (ref 7–17)
AST SERPL-CCNC: 26 U/L (ref 10–35)
BASOPHILS # BLD AUTO: 48 CELLS/UL (ref 0–200)
BASOPHILS NFR BLD AUTO: 0.6 %
BILIRUB SERPL-MCNC: 0.5 MG/DL (ref 0.2–1.2)
BUN SERPL-MCNC: 12 MG/DL (ref 7–25)
CALCIUM SERPL-MCNC: 9 MG/DL (ref 8.6–10.4)
CHLORIDE SERPL-SCNC: 101 MMOL/L (ref 98–110)
CO2 SERPL-SCNC: 31 MMOL/L (ref 20–32)
CREAT SERPL-MCNC: 0.63 MG/DL (ref 0.5–1.03)
EGFRCR SERPLBLD CKD-EPI 2021: 104 ML/MIN/1.73M2
EOSINOPHIL # BLD AUTO: 152 CELLS/UL (ref 15–500)
EOSINOPHIL NFR BLD AUTO: 1.9 %
ERYTHROCYTE [DISTWIDTH] IN BLOOD BY AUTOMATED COUNT: 13.4 % (ref 11–15)
EST. AVERAGE GLUCOSE BLD GHB EST-MCNC: 154 MG/DL
EST. AVERAGE GLUCOSE BLD GHB EST-SCNC: 8.5 MMOL/L
GLUCOSE SERPL-MCNC: 110 MG/DL (ref 65–99)
HBA1C MFR BLD: 7 % OF TOTAL HGB
HCT VFR BLD AUTO: 41.8 % (ref 35–45)
HGB BLD-MCNC: 13.7 G/DL (ref 11.7–15.5)
LYMPHOCYTES # BLD AUTO: 2424 CELLS/UL (ref 850–3900)
LYMPHOCYTES NFR BLD AUTO: 30.3 %
MCH RBC QN AUTO: 27.9 PG (ref 27–33)
MCHC RBC AUTO-ENTMCNC: 32.8 G/DL (ref 32–36)
MCV RBC AUTO: 85.1 FL (ref 80–100)
MONOCYTES # BLD AUTO: 472 CELLS/UL (ref 200–950)
MONOCYTES NFR BLD AUTO: 5.9 %
NEUTROPHILS # BLD AUTO: 4904 CELLS/UL (ref 1500–7800)
NEUTROPHILS NFR BLD AUTO: 61.3 %
PLATELET # BLD AUTO: 318 THOUSAND/UL (ref 140–400)
PMV BLD REES-ECKER: 9.1 FL (ref 7.5–12.5)
POTASSIUM SERPL-SCNC: 4.4 MMOL/L (ref 3.5–5.3)
PROT SERPL-MCNC: 7.2 G/DL (ref 6.1–8.1)
RBC # BLD AUTO: 4.91 MILLION/UL (ref 3.8–5.1)
SODIUM SERPL-SCNC: 139 MMOL/L (ref 135–146)
WBC # BLD AUTO: 8 THOUSAND/UL (ref 3.8–10.8)

## 2025-03-10 ENCOUNTER — APPOINTMENT (OUTPATIENT)
Dept: PRIMARY CARE | Facility: CLINIC | Age: 55
End: 2025-03-10
Payer: COMMERCIAL

## 2025-03-10 VITALS
HEIGHT: 65 IN | BODY MASS INDEX: 48.82 KG/M2 | HEART RATE: 86 BPM | SYSTOLIC BLOOD PRESSURE: 136 MMHG | DIASTOLIC BLOOD PRESSURE: 81 MMHG | WEIGHT: 293 LBS

## 2025-03-10 DIAGNOSIS — D22.9 SUSPICIOUS NEVUS: Primary | ICD-10-CM

## 2025-03-10 DIAGNOSIS — J06.9 UPPER RESPIRATORY TRACT INFECTION, UNSPECIFIED TYPE: ICD-10-CM

## 2025-03-10 DIAGNOSIS — E11.9 TYPE 2 DIABETES MELLITUS WITHOUT COMPLICATION, WITHOUT LONG-TERM CURRENT USE OF INSULIN (MULTI): ICD-10-CM

## 2025-03-10 DIAGNOSIS — D69.3 CHRONIC ITP (IDIOPATHIC THROMBOCYTOPENIC PURPURA) (MULTI): ICD-10-CM

## 2025-03-10 DIAGNOSIS — Z12.31 SCREENING MAMMOGRAM FOR BREAST CANCER: ICD-10-CM

## 2025-03-10 DIAGNOSIS — R61 HYPERHIDROSIS: ICD-10-CM

## 2025-03-10 LAB
POC RAPID INFLUENZA A: NEGATIVE
POC RAPID INFLUENZA B: NEGATIVE

## 2025-03-10 PROCEDURE — 3075F SYST BP GE 130 - 139MM HG: CPT | Performed by: FAMILY MEDICINE

## 2025-03-10 PROCEDURE — 3008F BODY MASS INDEX DOCD: CPT | Performed by: FAMILY MEDICINE

## 2025-03-10 PROCEDURE — 4010F ACE/ARB THERAPY RXD/TAKEN: CPT | Performed by: FAMILY MEDICINE

## 2025-03-10 PROCEDURE — 3079F DIAST BP 80-89 MM HG: CPT | Performed by: FAMILY MEDICINE

## 2025-03-10 PROCEDURE — 99214 OFFICE O/P EST MOD 30 MIN: CPT | Performed by: FAMILY MEDICINE

## 2025-03-10 PROCEDURE — 1036F TOBACCO NON-USER: CPT | Performed by: FAMILY MEDICINE

## 2025-03-10 PROCEDURE — 87804 INFLUENZA ASSAY W/OPTIC: CPT | Performed by: FAMILY MEDICINE

## 2025-03-10 RX ORDER — MUPIROCIN 20 MG/G
OINTMENT TOPICAL 3 TIMES DAILY
Qty: 22 G | Refills: 0 | Status: SHIPPED | OUTPATIENT
Start: 2025-03-10 | End: 2025-03-20

## 2025-03-10 RX ORDER — ALUMINUM CHLORIDE 20 %
SOLUTION, NON-ORAL TOPICAL NIGHTLY
Qty: 35 ML | Refills: 1 | Status: SHIPPED | OUTPATIENT
Start: 2025-03-10 | End: 2026-03-10

## 2025-03-10 ASSESSMENT — PATIENT HEALTH QUESTIONNAIRE - PHQ9
2. FEELING DOWN, DEPRESSED OR HOPELESS: NOT AT ALL
SUM OF ALL RESPONSES TO PHQ9 QUESTIONS 1 AND 2: 0
1. LITTLE INTEREST OR PLEASURE IN DOING THINGS: NOT AT ALL

## 2025-03-10 NOTE — PATIENT INSTRUCTIONS
Radiology:  [place]:  [phone]    Mammogram due after 7/15/2025    Dermatology:  [place] [phone]  Susan Mayne, CNP ([facility]) [phone]  Sharon Alvarez ([facility]) [phone]  Sang Chandra () [phone]

## 2025-04-08 ENCOUNTER — TELEMEDICINE (OUTPATIENT)
Dept: PRIMARY CARE | Facility: CLINIC | Age: 55
End: 2025-04-08
Payer: COMMERCIAL

## 2025-04-08 DIAGNOSIS — J01.90 ACUTE SINUSITIS, RECURRENCE NOT SPECIFIED, UNSPECIFIED LOCATION: Primary | ICD-10-CM

## 2025-04-08 RX ORDER — AMOXICILLIN AND CLAVULANATE POTASSIUM 875; 125 MG/1; MG/1
875 TABLET, FILM COATED ORAL 2 TIMES DAILY
Qty: 20 TABLET | Refills: 0 | Status: SHIPPED | OUTPATIENT
Start: 2025-04-08 | End: 2025-04-18

## 2025-04-08 RX ORDER — BENZONATATE 100 MG/1
100 CAPSULE ORAL 3 TIMES DAILY PRN
Qty: 42 CAPSULE | Refills: 0 | Status: SHIPPED | OUTPATIENT
Start: 2025-04-08 | End: 2025-05-08

## 2025-04-08 NOTE — PROGRESS NOTES
Subjective   Patient ID: Gina Cronin is a 54 y.o. female who presents for Sick Visit.    URI sx: Started on Friday with head congestion, HA, sinus pressure. Cough that is travelling into her chest. She is taking otc meds all weekend with small amount of relief. Cough is productive of small amount of yellow mucous. +PND, +ST, +rhinorrhea. No ear pressure but she did earlier. No vomiting or diarrhea. No fevers, +chills. Most of her pressure is in her forehead.     Objective   There were no vitals taken for this visit.        No data recorded        Assessment/Plan     #Acute sinusitis:  Rx sent augmentin, discussed probiotics  Recommending flonase  Fluids, rest, tylenol prn  Rx sent tessalon perles prn for cough

## 2025-04-18 DIAGNOSIS — K21.9 GASTROESOPHAGEAL REFLUX DISEASE, UNSPECIFIED WHETHER ESOPHAGITIS PRESENT: ICD-10-CM

## 2025-04-18 DIAGNOSIS — F41.8 DEPRESSION WITH ANXIETY: ICD-10-CM

## 2025-04-21 RX ORDER — OMEPRAZOLE 20 MG/1
20 CAPSULE, DELAYED RELEASE ORAL
Qty: 30 CAPSULE | Refills: 11 | Status: SHIPPED | OUTPATIENT
Start: 2025-04-21 | End: 2026-04-21

## 2025-04-21 RX ORDER — VENLAFAXINE HYDROCHLORIDE 150 MG/1
150 CAPSULE, EXTENDED RELEASE ORAL DAILY
Qty: 30 CAPSULE | Refills: 1 | Status: SHIPPED | OUTPATIENT
Start: 2025-04-21 | End: 2025-06-20

## 2025-05-05 DIAGNOSIS — L73.2 HYDRADENITIS: ICD-10-CM

## 2025-05-05 RX ORDER — DOXYCYCLINE HYCLATE 50 MG/1
50 CAPSULE, GELATIN COATED ORAL DAILY
Qty: 30 CAPSULE | Refills: 3 | Status: SHIPPED | OUTPATIENT
Start: 2025-05-05

## 2025-05-16 DIAGNOSIS — I10 HYPERTENSION, ESSENTIAL: ICD-10-CM

## 2025-05-19 RX ORDER — FUROSEMIDE 20 MG/1
TABLET ORAL
Qty: 270 TABLET | Refills: 1 | Status: SHIPPED | OUTPATIENT
Start: 2025-05-19

## 2025-05-20 DIAGNOSIS — E11.9 TYPE 2 DIABETES MELLITUS WITHOUT COMPLICATION, WITHOUT LONG-TERM CURRENT USE OF INSULIN: Primary | ICD-10-CM

## 2025-06-13 DIAGNOSIS — I10 HYPERTENSION, ESSENTIAL: ICD-10-CM

## 2025-06-13 DIAGNOSIS — F41.8 DEPRESSION WITH ANXIETY: ICD-10-CM

## 2025-06-16 DIAGNOSIS — E11.9 TYPE 2 DIABETES MELLITUS WITHOUT COMPLICATION, WITHOUT LONG-TERM CURRENT USE OF INSULIN: ICD-10-CM

## 2025-06-16 RX ORDER — VENLAFAXINE HYDROCHLORIDE 150 MG/1
150 CAPSULE, EXTENDED RELEASE ORAL DAILY
Qty: 30 CAPSULE | Refills: 1 | Status: SHIPPED | OUTPATIENT
Start: 2025-06-16 | End: 2025-08-15

## 2025-06-16 RX ORDER — METOPROLOL TARTRATE 50 MG/1
50 TABLET ORAL 2 TIMES DAILY
Qty: 60 TABLET | Refills: 11 | Status: SHIPPED | OUTPATIENT
Start: 2025-06-16 | End: 2026-06-16

## 2025-06-18 DIAGNOSIS — E11.9 TYPE 2 DIABETES MELLITUS WITHOUT COMPLICATION, WITHOUT LONG-TERM CURRENT USE OF INSULIN: ICD-10-CM

## 2025-06-18 RX ORDER — SEMAGLUTIDE 1.34 MG/ML
1 INJECTION, SOLUTION SUBCUTANEOUS
Qty: 3 ML | Refills: 0 | Status: SHIPPED | OUTPATIENT
Start: 2025-06-22

## 2025-06-21 DIAGNOSIS — I10 HYPERTENSION, ESSENTIAL: ICD-10-CM

## 2025-06-23 RX ORDER — LISINOPRIL 20 MG/1
20 TABLET ORAL DAILY
Qty: 30 TABLET | Refills: 11 | Status: SHIPPED | OUTPATIENT
Start: 2025-06-23 | End: 2026-06-23

## 2025-07-12 LAB
ALBUMIN SERPL-MCNC: 4 G/DL (ref 3.6–5.1)
ALP SERPL-CCNC: 68 U/L (ref 37–153)
ALT SERPL-CCNC: 29 U/L (ref 6–29)
ANION GAP SERPL CALCULATED.4IONS-SCNC: 10 MMOL/L (CALC) (ref 7–17)
AST SERPL-CCNC: 21 U/L (ref 10–35)
BASOPHILS # BLD AUTO: 27 CELLS/UL (ref 0–200)
BASOPHILS NFR BLD AUTO: 0.4 %
BILIRUB SERPL-MCNC: 0.3 MG/DL (ref 0.2–1.2)
BUN SERPL-MCNC: 16 MG/DL (ref 7–25)
CALCIUM SERPL-MCNC: 9.2 MG/DL (ref 8.6–10.4)
CHLORIDE SERPL-SCNC: 100 MMOL/L (ref 98–110)
CO2 SERPL-SCNC: 29 MMOL/L (ref 20–32)
CREAT SERPL-MCNC: 0.68 MG/DL (ref 0.5–1.03)
EGFRCR SERPLBLD CKD-EPI 2021: 102 ML/MIN/1.73M2
EOSINOPHIL # BLD AUTO: 107 CELLS/UL (ref 15–500)
EOSINOPHIL NFR BLD AUTO: 1.6 %
ERYTHROCYTE [DISTWIDTH] IN BLOOD BY AUTOMATED COUNT: 13.9 % (ref 11–15)
EST. AVERAGE GLUCOSE BLD GHB EST-MCNC: 143 MG/DL
EST. AVERAGE GLUCOSE BLD GHB EST-SCNC: 7.9 MMOL/L
GLUCOSE SERPL-MCNC: 106 MG/DL (ref 65–99)
HBA1C MFR BLD: 6.6 %
HCT VFR BLD AUTO: 41.1 % (ref 35–45)
HGB BLD-MCNC: 13.1 G/DL (ref 11.7–15.5)
LYMPHOCYTES # BLD AUTO: 2164 CELLS/UL (ref 850–3900)
LYMPHOCYTES NFR BLD AUTO: 32.3 %
MCH RBC QN AUTO: 27.8 PG (ref 27–33)
MCHC RBC AUTO-ENTMCNC: 31.9 G/DL (ref 32–36)
MCV RBC AUTO: 87.1 FL (ref 80–100)
MONOCYTES # BLD AUTO: 369 CELLS/UL (ref 200–950)
MONOCYTES NFR BLD AUTO: 5.5 %
NEUTROPHILS # BLD AUTO: 4033 CELLS/UL (ref 1500–7800)
NEUTROPHILS NFR BLD AUTO: 60.2 %
PLATELET # BLD AUTO: 330 THOUSAND/UL (ref 140–400)
PMV BLD REES-ECKER: 9 FL (ref 7.5–12.5)
POTASSIUM SERPL-SCNC: 4.5 MMOL/L (ref 3.5–5.3)
PROT SERPL-MCNC: 6.7 G/DL (ref 6.1–8.1)
RBC # BLD AUTO: 4.72 MILLION/UL (ref 3.8–5.1)
SODIUM SERPL-SCNC: 139 MMOL/L (ref 135–146)
TSH SERPL-ACNC: 1.2 MIU/L (ref 0.4–4.5)
WBC # BLD AUTO: 6.7 THOUSAND/UL (ref 3.8–10.8)

## 2025-07-14 DIAGNOSIS — E11.9 TYPE 2 DIABETES MELLITUS WITHOUT COMPLICATION, WITHOUT LONG-TERM CURRENT USE OF INSULIN: ICD-10-CM

## 2025-07-14 RX ORDER — SEMAGLUTIDE 1.34 MG/ML
1 INJECTION, SOLUTION SUBCUTANEOUS
Qty: 3 ML | Refills: 6 | Status: SHIPPED | OUTPATIENT
Start: 2025-07-14

## 2025-07-14 NOTE — PROGRESS NOTES
Subjective   Patient ID: Gina Cronin is a 54 y.o. female who presents for Follow-up (No new concerns or issues).     DM: Taking ozempic and metformin. Dealing with some constipation.      H/o blood clots: 2021, several in the legs and lung. She has had swelling since then. She is on warfarin lifelong. Getting INR monitored through [facility], following with Dr. Llanes.      ITP: Following with Dr. Llanes. Under control, not actively getting treatment.      HTN: On lisinopril, metoprolol, and lasix. No SE's.      Lymphedema: Going to [facility] who recommended her. She ended up getting massage, she has a pump that she is using daily. She is using custom stockings. She is wrapping them at night. She is taking lasix daily. She has been relatively stable this last 6mo with her regimen. Left leg worse than right.     Hidradenitis Supuritiva: On doxycycline. She has had surgery in the left axilla. Still occasionally getting flares/outbreaks. She has tried multiple different topical meds in the past.      GERD: Controlled on omeprazole.      Anxiety: On cymbalta, feels it is helping a bit but not happy with her symptoms. More anxious since menopause. Previously took 2 meds, the other was celexa. She has tried off both and that did not work. She does get hot flashes and mood swings. Has not tried effexor or paxil.      Review of systems completed and unremarkable other than what is documented in HPI.    Objective   /82 (BP Location: Right arm, Patient Position: Sitting, BP Cuff Size: Large adult long)   Pulse 78   Wt (!) 168 kg (370 lb)   BMI 61.57 kg/m²     Gen: No acute distress, alert and oriented x3, pleasant   HEENT: moist mucous membranes, b/l external auditory canals are clear of debris, TMs within normal limits, no oropharyngeal lesions, eomi, perrla   Neck: thyroid within normal limits, no lymphadenopathy   CV: RRR, normal S1/S2, no murmur   Resp: Clear to auscultation bilaterally, no  wheezes or rhonchi appreciated  Abd: soft, nontender, non-distended, no guarding/rigidity, bowel sounds present  Extr: no edema, no calf tenderness  Derm: Skin is warm and dry, no rashes appreciated  Psych: mood is good, affect is congruent, good hygiene, normal speech and eye contact  Neuro: cranial nerves grossly intact, normal gait      Assessment/Plan   #BCC  Doing ok since removal  Had rejection of her sutures and the incision opened up  She is on doxy for HS     #Hyperhidrosis  Trial dabomatic     #Diabetes  Insurance will not cover ozempic  She is on metformin now, no weight loss  Doing ok on trulicity but GI sx worse and A1c has gotten worse  Trial ozempic     #H/o blood clots:   2021, several in the legs and lung  On warfarin lifelong  INR monitoring at [facility] through [facility]     #ITP:   Following with Dr. Llanes for surveillance     #HTN:   Controlled on lisinopril, metoprolol, and lasix  Increasing lasix to 20mg t2 tabs QAM and 1 tab QPM     #Lymphedema:   Followup as needed from OT  Pumps at home  Using compression equipment  On lasix daily, increasing dose  Wrapping them at night     #Hidradenitis Supuritiva:   On doxycycline  H/o left axillary surgery  Has tried multiple topical meds in the past     #GERD:   Controlled on omeprazole     #Anxiety:   #Hot flashes  On cymbalta  Doing well on venlafaxine     HCM:  Pap 2024 neg, next due 2029  Mammogram for summer time  Cologuard 2024 neg

## 2025-07-15 ENCOUNTER — APPOINTMENT (OUTPATIENT)
Dept: PRIMARY CARE | Facility: CLINIC | Age: 55
End: 2025-07-15
Payer: COMMERCIAL

## 2025-07-15 VITALS
BODY MASS INDEX: 61.57 KG/M2 | HEART RATE: 78 BPM | SYSTOLIC BLOOD PRESSURE: 120 MMHG | DIASTOLIC BLOOD PRESSURE: 82 MMHG | WEIGHT: 293 LBS

## 2025-07-15 DIAGNOSIS — F41.9 ANXIETY: ICD-10-CM

## 2025-07-15 DIAGNOSIS — E11.9 TYPE 2 DIABETES MELLITUS WITHOUT COMPLICATION, WITHOUT LONG-TERM CURRENT USE OF INSULIN: ICD-10-CM

## 2025-07-15 DIAGNOSIS — K59.00 CONSTIPATION, UNSPECIFIED CONSTIPATION TYPE: Primary | ICD-10-CM

## 2025-07-15 PROCEDURE — 4010F ACE/ARB THERAPY RXD/TAKEN: CPT | Performed by: FAMILY MEDICINE

## 2025-07-15 PROCEDURE — 3074F SYST BP LT 130 MM HG: CPT | Performed by: FAMILY MEDICINE

## 2025-07-15 PROCEDURE — 3079F DIAST BP 80-89 MM HG: CPT | Performed by: FAMILY MEDICINE

## 2025-07-15 PROCEDURE — 99214 OFFICE O/P EST MOD 30 MIN: CPT | Performed by: FAMILY MEDICINE

## 2025-07-15 PROCEDURE — 1036F TOBACCO NON-USER: CPT | Performed by: FAMILY MEDICINE

## 2025-07-15 RX ORDER — POLYETHYLENE GLYCOL 3350 17 G/17G
17 POWDER, FOR SOLUTION ORAL DAILY
Qty: 30 PACKET | Refills: 0 | Status: SHIPPED | OUTPATIENT
Start: 2025-07-15 | End: 2025-07-17

## 2025-07-15 RX ORDER — BUSPIRONE HYDROCHLORIDE 10 MG/1
10 TABLET ORAL DAILY
Qty: 30 TABLET | Refills: 1 | Status: SHIPPED | OUTPATIENT
Start: 2025-07-15 | End: 2026-07-15

## 2025-07-16 DIAGNOSIS — K59.00 CONSTIPATION, UNSPECIFIED CONSTIPATION TYPE: ICD-10-CM

## 2025-07-17 RX ORDER — POLYETHYLENE GLYCOL 3350 17 G/17G
17 POWDER, FOR SOLUTION ORAL DAILY
Qty: 30 PACKET | Refills: 0 | Status: SHIPPED | OUTPATIENT
Start: 2025-07-17 | End: 2025-08-16

## 2025-08-12 DIAGNOSIS — E11.9 TYPE 2 DIABETES MELLITUS WITHOUT COMPLICATION, WITHOUT LONG-TERM CURRENT USE OF INSULIN: ICD-10-CM

## 2025-08-12 RX ORDER — SEMAGLUTIDE 1.34 MG/ML
1 INJECTION, SOLUTION SUBCUTANEOUS
Qty: 3 ML | Refills: 6 | Status: SHIPPED | OUTPATIENT
Start: 2025-08-12

## 2025-08-16 DIAGNOSIS — F41.8 DEPRESSION WITH ANXIETY: ICD-10-CM

## 2025-08-18 RX ORDER — VENLAFAXINE HYDROCHLORIDE 150 MG/1
150 CAPSULE, EXTENDED RELEASE ORAL
Qty: 30 CAPSULE | Refills: 1 | Status: SHIPPED | OUTPATIENT
Start: 2025-08-18

## 2025-08-29 ENCOUNTER — HOSPITAL ENCOUNTER (OUTPATIENT)
Dept: RADIOLOGY | Facility: HOSPITAL | Age: 55
Discharge: HOME | End: 2025-08-29
Payer: COMMERCIAL

## 2025-08-29 DIAGNOSIS — Z12.31 SCREENING MAMMOGRAM FOR BREAST CANCER: ICD-10-CM

## 2025-08-29 PROCEDURE — 77067 SCR MAMMO BI INCL CAD: CPT

## 2025-09-12 ENCOUNTER — APPOINTMENT (OUTPATIENT)
Dept: DERMATOLOGY | Facility: CLINIC | Age: 55
End: 2025-09-12
Payer: COMMERCIAL

## 2025-11-14 ENCOUNTER — APPOINTMENT (OUTPATIENT)
Dept: PRIMARY CARE | Facility: CLINIC | Age: 55
End: 2025-11-14
Payer: COMMERCIAL